# Patient Record
Sex: FEMALE | Race: BLACK OR AFRICAN AMERICAN | Employment: FULL TIME | URBAN - METROPOLITAN AREA
[De-identification: names, ages, dates, MRNs, and addresses within clinical notes are randomized per-mention and may not be internally consistent; named-entity substitution may affect disease eponyms.]

---

## 2019-10-02 ENCOUNTER — HOSPITAL ENCOUNTER (OUTPATIENT)
Dept: CT IMAGING | Age: 29
Discharge: HOME | End: 2019-10-04
Payer: COMMERCIAL

## 2019-10-02 DIAGNOSIS — R10.9 FLANK PAIN: ICD-10-CM

## 2019-10-02 PROCEDURE — 2580000003 HC RX 258: Performed by: RADIOLOGY

## 2019-10-02 PROCEDURE — 74178 CT ABD&PLV WO CNTR FLWD CNTR: CPT

## 2019-10-02 PROCEDURE — 6360000004 HC RX CONTRAST MEDICATION: Performed by: RADIOLOGY

## 2019-10-02 RX ORDER — SODIUM CHLORIDE 0.9 % (FLUSH) 0.9 %
10 SYRINGE (ML) INJECTION
Status: COMPLETED | OUTPATIENT
Start: 2019-10-02 | End: 2019-10-02

## 2019-10-02 RX ADMIN — Medication 10 ML: at 09:07

## 2019-10-02 RX ADMIN — IOPAMIDOL 110 ML: 755 INJECTION, SOLUTION INTRAVENOUS at 09:07

## 2019-10-02 RX ADMIN — IOHEXOL 50 ML: 240 INJECTION, SOLUTION INTRATHECAL; INTRAVASCULAR; INTRAVENOUS; ORAL at 09:07

## 2020-01-09 ENCOUNTER — HOSPITAL ENCOUNTER (EMERGENCY)
Age: 30
Discharge: HOME | End: 2020-01-09
Payer: COMMERCIAL

## 2020-01-09 VITALS
TEMPERATURE: 98.4 F | RESPIRATION RATE: 18 BRPM | HEART RATE: 88 BPM | SYSTOLIC BLOOD PRESSURE: 117 MMHG | DIASTOLIC BLOOD PRESSURE: 80 MMHG | OXYGEN SATURATION: 99 %

## 2020-01-09 PROCEDURE — 99282 EMERGENCY DEPT VISIT SF MDM: CPT

## 2020-01-09 RX ORDER — PREDNISONE 10 MG/1
TABLET ORAL
Qty: 20 TABLET | Refills: 0 | Status: SHIPPED | OUTPATIENT
Start: 2020-01-09 | End: 2020-01-19

## 2020-01-09 RX ORDER — NAPROXEN 500 MG/1
500 TABLET ORAL 2 TIMES DAILY PRN
Qty: 28 TABLET | Refills: 0 | Status: SHIPPED | OUTPATIENT
Start: 2020-01-09 | End: 2020-01-28

## 2020-01-09 RX ORDER — METHOCARBAMOL 500 MG/1
500 TABLET, FILM COATED ORAL 4 TIMES DAILY
Qty: 20 TABLET | Refills: 0 | Status: SHIPPED | OUTPATIENT
Start: 2020-01-09 | End: 2020-01-14

## 2020-01-09 ASSESSMENT — PAIN SCALES - GENERAL: PAINLEVEL_OUTOF10: 10

## 2020-01-09 ASSESSMENT — PAIN DESCRIPTION - PAIN TYPE: TYPE: CHRONIC PAIN

## 2020-01-09 NOTE — ED PROVIDER NOTES
DISPOSITION  Disposition: Discharge to home  Patient condition is good                 MIGUELINA Castro CNP  01/11/20 9346

## 2020-01-17 ENCOUNTER — APPOINTMENT (OUTPATIENT)
Dept: CT IMAGING | Age: 30
End: 2020-01-17
Payer: COMMERCIAL

## 2020-01-17 ENCOUNTER — HOSPITAL ENCOUNTER (EMERGENCY)
Age: 30
Discharge: HOME | End: 2020-01-17
Attending: EMERGENCY MEDICINE
Payer: COMMERCIAL

## 2020-01-17 VITALS
WEIGHT: 139 LBS | TEMPERATURE: 98 F | DIASTOLIC BLOOD PRESSURE: 80 MMHG | BODY MASS INDEX: 19.9 KG/M2 | HEIGHT: 70 IN | HEART RATE: 64 BPM | SYSTOLIC BLOOD PRESSURE: 120 MMHG | OXYGEN SATURATION: 100 % | RESPIRATION RATE: 18 BRPM

## 2020-01-17 LAB — HCG(URINE) PREGNANCY TEST: NEGATIVE

## 2020-01-17 PROCEDURE — 96374 THER/PROPH/DIAG INJ IV PUSH: CPT

## 2020-01-17 PROCEDURE — 81025 URINE PREGNANCY TEST: CPT

## 2020-01-17 PROCEDURE — 70450 CT HEAD/BRAIN W/O DYE: CPT

## 2020-01-17 PROCEDURE — 6360000002 HC RX W HCPCS: Performed by: EMERGENCY MEDICINE

## 2020-01-17 PROCEDURE — 99284 EMERGENCY DEPT VISIT MOD MDM: CPT

## 2020-01-17 RX ORDER — DIPHENHYDRAMINE HYDROCHLORIDE 50 MG/ML
25 INJECTION INTRAMUSCULAR; INTRAVENOUS ONCE
Status: COMPLETED | OUTPATIENT
Start: 2020-01-17 | End: 2020-01-17

## 2020-01-17 RX ORDER — KETOROLAC TROMETHAMINE 30 MG/ML
30 INJECTION, SOLUTION INTRAMUSCULAR; INTRAVENOUS EVERY 6 HOURS PRN
Status: DISCONTINUED | OUTPATIENT
Start: 2020-01-17 | End: 2020-01-17 | Stop reason: HOSPADM

## 2020-01-17 RX ORDER — DIPHENHYDRAMINE HYDROCHLORIDE 50 MG/ML
25 INJECTION INTRAMUSCULAR; INTRAVENOUS ONCE
Status: DISCONTINUED | OUTPATIENT
Start: 2020-01-17 | End: 2020-01-17 | Stop reason: HOSPADM

## 2020-01-17 RX ORDER — BUTALBITAL, ACETAMINOPHEN AND CAFFEINE 50; 325; 40 MG/1; MG/1; MG/1
1 TABLET ORAL EVERY 4 HOURS PRN
Qty: 20 TABLET | Refills: 0 | Status: SHIPPED | OUTPATIENT
Start: 2020-01-17 | End: 2020-01-24 | Stop reason: ALTCHOICE

## 2020-01-17 RX ADMIN — DIPHENHYDRAMINE HYDROCHLORIDE 25 MG: 50 INJECTION, SOLUTION INTRAMUSCULAR; INTRAVENOUS at 17:07

## 2020-01-17 RX ADMIN — KETOROLAC TROMETHAMINE 30 MG: 30 INJECTION, SOLUTION INTRAMUSCULAR; INTRAVENOUS at 17:07

## 2020-01-17 ASSESSMENT — PAIN SCALES - GENERAL
PAINLEVEL_OUTOF10: 10
PAINLEVEL_OUTOF10: 3
PAINLEVEL_OUTOF10: 10

## 2020-01-17 ASSESSMENT — PAIN DESCRIPTION - PAIN TYPE
TYPE: ACUTE PAIN
TYPE: ACUTE PAIN

## 2020-01-17 ASSESSMENT — PAIN DESCRIPTION - DESCRIPTORS: DESCRIPTORS: DISCOMFORT;THROBBING

## 2020-01-17 ASSESSMENT — PAIN DESCRIPTION - LOCATION
LOCATION: HEAD
LOCATION: HEAD;BACK;NECK

## 2020-01-17 NOTE — ED NOTES
Amariienmarybeth at bedside to take pt home     Amie Cardenas [facility]  01/17/20 3724

## 2020-01-17 NOTE — ED PROVIDER NOTES
HPI:  20,   Time: 4:15 PM         Marcie Gosselin is a 30 y.o. female presenting to the ED for a frontal headache, beginning 1 week ago. The complaint has been persistent, severe in severity, and worsened by changing position, light exertion. She also complains of neck pain for several months the patient has been taking Naprosyn and methocarbamol with no relief. There has been no fever chills and the patient denies any focal neurological deficits. . She states she has had a tubal ligation and takes birth control shots    ROS:   Pertinent positives and negatives are stated within HPI, all other systems reviewed and are negative.  --------------------------------------------- PAST HISTORY ---------------------------------------------  Past Medical History:  has a past medical history of GERD (gastroesophageal reflux disease), Mild or unspecified pre-eclampsia, antepartum, and UTI (urinary tract infection). Past Surgical History:  has a past surgical history that includes Induced ; Dental surgery; Colonoscopy; Dilation & curettage;  section; and Tubal ligation. Social History:  reports that she has quit smoking. Her smoking use included cigarettes. She smoked 0.25 packs per day. She has never used smokeless tobacco. She reports that she does not drink alcohol or use drugs. Family History: family history includes Diabetes in her maternal grandmother; Hypertension in her maternal grandmother. The patients home medications have been reviewed. Allergies: Patient has no known allergies.     -------------------------------------------------- RESULTS -------------------------------------------------  All laboratory and radiology results have been personally reviewed by myself   LABS:  Results for orders placed or performed during the hospital encounter of 20   Pregnancy, urine   Result Value Ref Range    HCG(Urine) Pregnancy Test NEGATIVE NEGATIVE       RADIOLOGY:  Interpreted by Radiologist.  CT Head WO Contrast   Final Result      No evidence of acute intracranial hemorrhage or edema. ------------------------- NURSING NOTES AND VITALS REVIEWED ---------------------------   The nursing notes within the ED encounter and vital signs as below have been reviewed. BP (!) 159/89   Pulse 64   Temp 98 °F (36.7 °C) (Oral)   Resp 16   Ht 5' 10\" (1.778 m)   Wt 139 lb (63 kg)   SpO2 99%   BMI 19.94 kg/m²   Oxygen Saturation Interpretation: Normal      ---------------------------------------------------PHYSICAL EXAM--------------------------------------      Constitutional/General: Alert and oriented x3, well appearing, non toxic in NAD  Head: NC/AT  Eyes: PERRL, EOMI  Mouth: Oral mucosa appears well-hydrated  Neck: Supple, full ROM, no meningeal signs  Pulmonary: Lungs clear to auscultation bilaterally, no wheezes, rales, or rhonchi. Not in respiratory distress  Cardiovascular:  Regular rate and rhythm, no murmurs, gallops, or rubs. 2+ distal pulses    Extremities: Moves all extremities x 4. Warm and well perfused  Skin: warm and dry without rash  Neurologic: Basic neurological examination shows no deficits grosslyName: Andrea Chinchilla  : 1990  MRN: [id number]    Date: 2020    Benefits of immediately proceeding with Radiology exam outweigh the risks and therefore the following is being waived:      [x] Pregnancy test    [] Protocol for Iodine allergy    [] MRI questionnaire    [] BUN/Creatinine        Beryl Wheeler MD      ,  Psych: Normal Affect      ------------------------------ ED COURSE/MEDICAL DECISION MAKING----------------------  Medications   ketorolac (TORADOL) injection 30 mg (30 mg Intravenous Given 20)   diphenhydrAMINE (BENADRYL) injection 25 mg (has no administration in time range)   diphenhydrAMINE (BENADRYL) injection 25 mg (25 mg Intravenous Given 20)         Medical Decision Making:      Time: 5:30p  Re-evaluation.

## 2020-01-17 NOTE — ED NOTES
C/O headache across forehead and pain in neck down entire spine. States has been going to physical therapy, has seen PCP and has been seen at [facility] main ED.   States really worried now since started having blurred vision     Sukumar Davey RN  01/17/20 122 Flor Clifton RN  01/17/20 4496

## 2020-01-17 NOTE — ED NOTES
Patient given discharge paperwork at this time. Patient also given scripts. Patient has no further questions at this time. Nurse provided education to patient. Patient is alert and oriented and VS are stable at this time.         Amie Cardenas RN  01/17/20 8931

## 2020-01-24 ENCOUNTER — HOSPITAL ENCOUNTER (EMERGENCY)
Age: 30
Discharge: HOME | End: 2020-01-24
Attending: EMERGENCY MEDICINE
Payer: COMMERCIAL

## 2020-01-24 VITALS
OXYGEN SATURATION: 98 % | WEIGHT: 137 LBS | TEMPERATURE: 98.3 F | DIASTOLIC BLOOD PRESSURE: 64 MMHG | RESPIRATION RATE: 16 BRPM | SYSTOLIC BLOOD PRESSURE: 112 MMHG | HEIGHT: 70 IN | BODY MASS INDEX: 19.61 KG/M2 | HEART RATE: 62 BPM

## 2020-01-24 LAB
INFLUENZA A BY PCR: NOT DETECTED
INFLUENZA B BY PCR: NOT DETECTED

## 2020-01-24 PROCEDURE — 99284 EMERGENCY DEPT VISIT MOD MDM: CPT

## 2020-01-24 PROCEDURE — 6360000002 HC RX W HCPCS: Performed by: EMERGENCY MEDICINE

## 2020-01-24 PROCEDURE — 87502 INFLUENZA DNA AMP PROBE: CPT

## 2020-01-24 PROCEDURE — 96375 TX/PRO/DX INJ NEW DRUG ADDON: CPT

## 2020-01-24 PROCEDURE — 96374 THER/PROPH/DIAG INJ IV PUSH: CPT

## 2020-01-24 PROCEDURE — 2580000003 HC RX 258: Performed by: EMERGENCY MEDICINE

## 2020-01-24 RX ORDER — METOCLOPRAMIDE HYDROCHLORIDE 5 MG/ML
10 INJECTION INTRAMUSCULAR; INTRAVENOUS ONCE
Status: COMPLETED | OUTPATIENT
Start: 2020-01-24 | End: 2020-01-24

## 2020-01-24 RX ORDER — LORAZEPAM 2 MG/ML
0.5 INJECTION INTRAMUSCULAR ONCE
Status: COMPLETED | OUTPATIENT
Start: 2020-01-24 | End: 2020-01-24

## 2020-01-24 RX ORDER — ONDANSETRON 4 MG/1
8 TABLET, ORALLY DISINTEGRATING ORAL EVERY 8 HOURS PRN
Qty: 12 TABLET | Refills: 0 | Status: SHIPPED | OUTPATIENT
Start: 2020-01-24

## 2020-01-24 RX ORDER — 0.9 % SODIUM CHLORIDE 0.9 %
1000 INTRAVENOUS SOLUTION INTRAVENOUS ONCE
Status: COMPLETED | OUTPATIENT
Start: 2020-01-24 | End: 2020-01-24

## 2020-01-24 RX ORDER — SUMATRIPTAN 50 MG/1
50 TABLET, FILM COATED ORAL
COMMUNITY

## 2020-01-24 RX ORDER — BUTALBITAL, ACETAMINOPHEN AND CAFFEINE 300; 40; 50 MG/1; MG/1; MG/1
1 CAPSULE ORAL EVERY 4 HOURS PRN
Qty: 30 CAPSULE | Refills: 0 | Status: SHIPPED | OUTPATIENT
Start: 2020-01-24 | End: 2020-01-31 | Stop reason: SDUPTHER

## 2020-01-24 RX ORDER — LORATADINE AND PSEUDOEPHEDRINE SULFATE 5; 120 MG/1; MG/1
1 TABLET, EXTENDED RELEASE ORAL 2 TIMES DAILY
Qty: 20 TABLET | Refills: 0 | Status: SHIPPED | OUTPATIENT
Start: 2020-01-24

## 2020-01-24 RX ORDER — DIPHENHYDRAMINE HYDROCHLORIDE 50 MG/ML
50 INJECTION INTRAMUSCULAR; INTRAVENOUS ONCE
Status: COMPLETED | OUTPATIENT
Start: 2020-01-24 | End: 2020-01-24

## 2020-01-24 RX ADMIN — METOCLOPRAMIDE 10 MG: 5 INJECTION, SOLUTION INTRAMUSCULAR; INTRAVENOUS at 21:20

## 2020-01-24 RX ADMIN — SODIUM CHLORIDE 1000 ML: 9 INJECTION, SOLUTION INTRAVENOUS at 21:19

## 2020-01-24 RX ADMIN — LORAZEPAM 0.5 MG: 2 INJECTION INTRAMUSCULAR; INTRAVENOUS at 21:20

## 2020-01-24 RX ADMIN — DIPHENHYDRAMINE HYDROCHLORIDE 50 MG: 50 INJECTION, SOLUTION INTRAMUSCULAR; INTRAVENOUS at 21:20

## 2020-01-24 ASSESSMENT — PAIN DESCRIPTION - DESCRIPTORS: DESCRIPTORS: THROBBING

## 2020-01-24 ASSESSMENT — PAIN DESCRIPTION - LOCATION: LOCATION: HEAD

## 2020-01-24 ASSESSMENT — PAIN SCALES - GENERAL: PAINLEVEL_OUTOF10: 10

## 2020-01-24 ASSESSMENT — PAIN DESCRIPTION - PROGRESSION: CLINICAL_PROGRESSION: GRADUALLY WORSENING

## 2020-01-24 ASSESSMENT — PAIN DESCRIPTION - PAIN TYPE: TYPE: ACUTE PAIN

## 2020-01-24 ASSESSMENT — PAIN DESCRIPTION - FREQUENCY: FREQUENCY: CONTINUOUS

## 2020-01-24 ASSESSMENT — PAIN DESCRIPTION - ORIENTATION: ORIENTATION: ANTERIOR;POSTERIOR

## 2020-01-24 ASSESSMENT — PAIN - FUNCTIONAL ASSESSMENT: PAIN_FUNCTIONAL_ASSESSMENT: PREVENTS OR INTERFERES SOME ACTIVE ACTIVITIES AND ADLS

## 2020-01-25 NOTE — ED PROVIDER NOTES
20)   LORazepam (ATIVAN) injection 0.5 mg (0.5 mg Intravenous Given 20)       ED COURSE:    Medical Decision Making:   Differential Diagnoses:  Common migraine headache, cluster headache, muscular tension headache, SAH, to name a few. Patient's headache is similar to previous headaches which she has had in the past.  This not felt to be the worst headache of her life. Patient was here previously and had a CT head study on 2020. No further studies are felt to be warranted at this time;  patient states her symptoms are markedly improved on repeat assessment prior to discharge. Counseling: The emergency provider has spoken with the patient and spouse/SO and discussed todays results, in addition to providing specific details for the plan of care and counseling regarding the diagnosis and prognosis. Questions are answered at this time and they are agreeable with the plan. Controlled Substance Monitoring:  Acute and Chronic Pain Monitoring:   No flowsheet data found. Radiology Procedure Waiver   Name: Girma Martinez  : 1990  MRN: [id number]    Date:  20    Time: 8:38 PM    Benefits of immediately proceeding with Radiology exam(s) without pre-testing outweigh the risks or are not indicated as specified below and therefore the following is/are being waived:    [x] Pregnancy test   [] Patients LMP on-time and regular. [x] Patient had Tubal Ligation or has other Contraception Device. [] Patient  is Menopausal or Premenarcheal.    [] Patient had Full or Partial Hysterectomy. [] Protocol for Iodine allergy    [] MRI Questionnaire     [] BUN/Creatinine   [] Patient age w/no hx of renal dysfunction. [] Patient on Dialysis. [] Recent Normal Labs. Electronically signed by Fran Estes MD on 20 at 8:38 PM            --------------------------------- IMPRESSION AND DISPOSITION ---------------------------------    IMPRESSION  1.  Migraine without aura and with

## 2020-01-28 ENCOUNTER — TELEPHONE (OUTPATIENT)
Dept: PHYSICAL MEDICINE AND REHAB | Age: 30
End: 2020-01-28

## 2020-01-28 ENCOUNTER — OFFICE VISIT (OUTPATIENT)
Dept: PHYSICAL MEDICINE AND REHAB | Age: 30
End: 2020-01-28
Payer: COMMERCIAL

## 2020-01-28 VITALS
WEIGHT: 137 LBS | HEART RATE: 64 BPM | HEIGHT: 70 IN | DIASTOLIC BLOOD PRESSURE: 66 MMHG | SYSTOLIC BLOOD PRESSURE: 118 MMHG | BODY MASS INDEX: 19.61 KG/M2

## 2020-01-28 PROCEDURE — G8427 DOCREV CUR MEDS BY ELIG CLIN: HCPCS | Performed by: PHYSICAL MEDICINE & REHABILITATION

## 2020-01-28 PROCEDURE — 99204 OFFICE O/P NEW MOD 45 MIN: CPT | Performed by: PHYSICAL MEDICINE & REHABILITATION

## 2020-01-28 PROCEDURE — 1036F TOBACCO NON-USER: CPT | Performed by: PHYSICAL MEDICINE & REHABILITATION

## 2020-01-28 PROCEDURE — G8484 FLU IMMUNIZE NO ADMIN: HCPCS | Performed by: PHYSICAL MEDICINE & REHABILITATION

## 2020-01-28 PROCEDURE — G8420 CALC BMI NORM PARAMETERS: HCPCS | Performed by: PHYSICAL MEDICINE & REHABILITATION

## 2020-01-28 RX ORDER — TIZANIDINE 2 MG/1
2 TABLET ORAL EVERY 8 HOURS PRN
Qty: 90 TABLET | Refills: 1 | Status: SHIPPED | OUTPATIENT
Start: 2020-01-28

## 2020-01-28 NOTE — PROGRESS NOTES
Chiquita Modi, [id number] [facility] Physical Medicine and Rehabilitation  [address] [facility] [facility] [place]  Phone: [phone]  Fax: [phone]    PCP: Arian Palacios DO  Date of visit: 1/28/20    Chief Complaint   Patient presents with   Otilia Trinh Patient   Hari Whitfield       Dear Dr. Gorge Bell,     Thank you for referring your patient to be seen. As you know,  Ashley Noland is a 30 y.o. female with past medical history as below who presents with neck and back pain for 4 month(s). There was a gradual onset of pain after no known injury. Now, the pain is constant and occurs daily. The pain is rated Pain Score:  10 - Worst pain ever, is described as throbbing, sharp and is located in the left low back with radiation \"up spine\". The symptoms have been worse since onset. The pain is better with nothing. The pain is worse with everything. There is no associated numbness/tingling. There is no weakness. There is no bowel/bladder changes. The prior workup has included: none     The prior treatment has included:  PT: Lucian with temporary relief    Chiropractic: none    Modalities: none    OTC Tylenol: none    NSAIDS: naprosyn, ibuprofen with no relief   Opioids: none    Membrane stabilizers: none    Muscle relaxers: robaxin with no relief   Previous injections: none .    Previous surgery at this site: none    No Known Allergies    Current Outpatient Medications   Medication Sig Dispense Refill    diclofenac (VOLTAREN) 50 MG EC tablet Take 1 tablet by mouth 2 times daily as needed for Pain 60 tablet 2    tiZANidine (ZANAFLEX) 2 MG tablet Take 1 tablet by mouth every 8 hours as needed (pain) 90 tablet 1    Tens Unit MISC by Does not apply route 4-lead 1 each 0    SUMAtriptan (IMITREX) 50 MG tablet Take 50 mg by mouth once as needed for Migraine      butalbital-APAP-caffeine (FIORICET) -40 MG CAPS per capsule Take 1 capsule by mouth every 4 hours as needed for Headaches 30 capsule 0    ondansetron (ZOFRAN ODT) 4 MG disintegrating tablet Take 2 tablets by mouth every 8 hours as needed for Nausea or Vomiting 12 tablet 0    CLARITIN-D 12 HOUR 5-120 MG per extended release tablet Take 1 tablet by mouth 2 times daily For congestion relief as needed. 20 tablet 0     No current facility-administered medications for this visit. Past Medical History:   Diagnosis Date    GERD (gastroesophageal reflux disease)     Mild or unspecified pre-eclampsia, antepartum 2014    UTI (urinary tract infection)        Past Surgical History:   Procedure Laterality Date     SECTION      COLONOSCOPY      DENTAL SURGERY      DILATION AND CURETTAGE      INDUCED       TUBAL LIGATION         Family History   Problem Relation Age of Onset    Diabetes Maternal Grandmother     Hypertension Maternal Grandmother        Social History     Tobacco Use    Smoking status: Former Smoker     Packs/day: 0.25     Types: Cigarettes    Smokeless tobacco: Never Used   Substance Use Topics    Alcohol use: No    Drug use: No          Functional Status: The patient is able to ambulate and perform activities of daily living without the use of an assistive device. 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 ROS: For more complete ROS answered by the patient, please see . Constitutional: + fevers, chills, denies night sweats, unintentional weight loss     Skin: Denies rash or skin changes     Eyes: Denies vision changes    Ears/Nose/Throat: Denies nasal congestion or sore throat     Respiratory: + SOB or cough     Cardiovascular: + CP, palpitations, edema      Gastrointestinal: +abdominal pain,  N/V, constipation, or diarrhea    Genitourinary: Denies urinary symptoms    Neurologic: See HPI.     MSK: See HPI.      Psychiatric: Denies sleep disturbance, anxiety, depression Reflexes:   R  L  Biceps   (2+) (2+)  Triceps  (2+) (2+)  BR   (2+) (2+)  Patellar  (2+) (2+)  Ankle Jerk  (2+) (2+)    Sommer is negative bilaterally. Gait is Normal.     Imaging:     Impression:   Yaritza Chacon is a 30 y.o. female     1. Neck pain    2. Chronic bilateral thoracic back pain    3. Chronic left-sided low back pain without sciatica        Plan:   · Obtain cervical/thoracic/lumbar x-ray   · TENS trial   · Start voltaren 50mg BID PRN   · Start zanaflex 2mg TID PRN      The patient was educated about the diagnosis, prognosis, indications, risks and benefits of treatment. An opportunity to ask questions was given to the patient and questions were answered. The patient agreed to proceed with the recommended treatment as described above.  Follow up in 6 weeks. Thank you for the consultation and for allowing me to participate in the care of this patient.         Sincerely,     Amanda Kothari DO, [facility]   Board Certified Physical Medicine and Rehabilitation

## 2020-01-28 NOTE — TELEPHONE ENCOUNTER
----- Message from Regan Neal DO sent at 1/28/2020 12:10 PM EST -----  Please call patient with x-results--- all normal.

## 2020-01-28 NOTE — TELEPHONE ENCOUNTER
Called and spoke with the patient to inform her that her xray results were normal.  Patient is aware and voiced understanding.

## 2020-01-31 ENCOUNTER — HOSPITAL ENCOUNTER (EMERGENCY)
Age: 30
Discharge: HOME | End: 2020-01-31
Payer: COMMERCIAL

## 2020-01-31 VITALS
TEMPERATURE: 98.1 F | BODY MASS INDEX: 19.47 KG/M2 | DIASTOLIC BLOOD PRESSURE: 78 MMHG | RESPIRATION RATE: 14 BRPM | WEIGHT: 136 LBS | HEIGHT: 70 IN | SYSTOLIC BLOOD PRESSURE: 118 MMHG | OXYGEN SATURATION: 99 % | HEART RATE: 84 BPM

## 2020-01-31 LAB — HCG(URINE) PREGNANCY TEST: NEGATIVE

## 2020-01-31 PROCEDURE — 99284 EMERGENCY DEPT VISIT MOD MDM: CPT

## 2020-01-31 PROCEDURE — 2580000003 HC RX 258: Performed by: EMERGENCY MEDICINE

## 2020-01-31 PROCEDURE — 96361 HYDRATE IV INFUSION ADD-ON: CPT

## 2020-01-31 PROCEDURE — 81025 URINE PREGNANCY TEST: CPT

## 2020-01-31 PROCEDURE — 96374 THER/PROPH/DIAG INJ IV PUSH: CPT

## 2020-01-31 PROCEDURE — 96375 TX/PRO/DX INJ NEW DRUG ADDON: CPT

## 2020-01-31 PROCEDURE — 6360000002 HC RX W HCPCS: Performed by: EMERGENCY MEDICINE

## 2020-01-31 RX ORDER — DIPHENHYDRAMINE HYDROCHLORIDE 50 MG/ML
25 INJECTION INTRAMUSCULAR; INTRAVENOUS ONCE
Status: COMPLETED | OUTPATIENT
Start: 2020-01-31 | End: 2020-01-31

## 2020-01-31 RX ORDER — SODIUM CHLORIDE, SODIUM LACTATE, POTASSIUM CHLORIDE, CALCIUM CHLORIDE 600; 310; 30; 20 MG/100ML; MG/100ML; MG/100ML; MG/100ML
1000 INJECTION, SOLUTION INTRAVENOUS ONCE
Status: COMPLETED | OUTPATIENT
Start: 2020-01-31 | End: 2020-01-31

## 2020-01-31 RX ORDER — METOCLOPRAMIDE HYDROCHLORIDE 5 MG/ML
10 INJECTION INTRAMUSCULAR; INTRAVENOUS ONCE
Status: COMPLETED | OUTPATIENT
Start: 2020-01-31 | End: 2020-01-31

## 2020-01-31 RX ORDER — KETOROLAC TROMETHAMINE 30 MG/ML
30 INJECTION, SOLUTION INTRAMUSCULAR; INTRAVENOUS ONCE
Status: COMPLETED | OUTPATIENT
Start: 2020-01-31 | End: 2020-01-31

## 2020-01-31 RX ORDER — BUTALBITAL, ACETAMINOPHEN AND CAFFEINE 300; 40; 50 MG/1; MG/1; MG/1
1 CAPSULE ORAL EVERY 6 HOURS PRN
Qty: 10 CAPSULE | Refills: 0 | Status: SHIPPED | OUTPATIENT
Start: 2020-01-31

## 2020-01-31 RX ADMIN — KETOROLAC TROMETHAMINE 30 MG: 30 INJECTION, SOLUTION INTRAMUSCULAR at 18:58

## 2020-01-31 RX ADMIN — SODIUM CHLORIDE, POTASSIUM CHLORIDE, SODIUM LACTATE AND CALCIUM CHLORIDE 1000 ML: 600; 310; 30; 20 INJECTION, SOLUTION INTRAVENOUS at 19:01

## 2020-01-31 RX ADMIN — METOCLOPRAMIDE 10 MG: 5 INJECTION, SOLUTION INTRAMUSCULAR; INTRAVENOUS at 18:57

## 2020-01-31 RX ADMIN — DIPHENHYDRAMINE HYDROCHLORIDE 25 MG: 50 INJECTION, SOLUTION INTRAMUSCULAR; INTRAVENOUS at 18:57

## 2020-01-31 ASSESSMENT — PAIN DESCRIPTION - LOCATION: LOCATION: HEAD

## 2020-01-31 ASSESSMENT — PAIN SCALES - GENERAL
PAINLEVEL_OUTOF10: 3
PAINLEVEL_OUTOF10: 10

## 2020-01-31 ASSESSMENT — PAIN DESCRIPTION - PAIN TYPE: TYPE: ACUTE PAIN

## 2020-01-31 ASSESSMENT — PAIN DESCRIPTION - DESCRIPTORS: DESCRIPTORS: THROBBING

## 2020-01-31 NOTE — ED NOTES
Asked patient for urine sample, patient refused, said Ahsley Katz has already given us one this week and her tubes are tied. \"     Suzanne Rowland  01/31/20 0760

## 2020-01-31 NOTE — ED NOTES
Pt at restroom to collect a urine sample for pregnancy test.      Shannon Garcia, HAYLEY  01/31/20 2396

## 2020-01-31 NOTE — ED NOTES
Pt reports light sensitivity. Lights were dimmed in the room. Call light is within reach.      Two [facility]  01/31/20 182

## 2020-02-01 NOTE — ED NOTES
IV removed. Dressing applied. Discharge instructions and prescription given. Patient states verbal understanding. Ambulatory to exit.        Dorina Smith RN  01/31/20 2055

## 2020-02-01 NOTE — ED PROVIDER NOTES
deformities. Back: No midline or paraspinal tenderness  Skin:  Warm and dry. Neurologic: CNs grossly intact. Normal motor function, normal sensory function, no other focal deficits noted. Psychiatric:  Appropriate mood and behavior        NURSING NOTES AND VITALS REVIEWED  The nursing notes within the ED encounter and vital signs as below have been reviewed by myself. Patient Vitals for the past 24 hrs:   BP Temp Temp src Pulse Resp SpO2 Height Weight   01/31/20 1804 -- -- -- -- -- -- 5' 10\" (1.778 m) 136 lb (61.7 kg)   01/31/20 1756 118/78 98.1 °F (36.7 °C) Oral 84 14 99 % -- --       The patients available past medical records and past encounters were reviewed. Medical decision making: Patient is a 20-year-old female presenting for headache that has been ongoing for 3-1/2 weeks. Patient's vital signs were stable. Her physical exam was unremarkable. She had a normal neurological exam.  Patient was given IV fluids, Reglan, Benadryl and Toradol. Her prior CT head showed no acute process a lower concern for any tumors or intracranial process. Her symptoms have been ongoing for weeks and is not acute and is severe on onset low concern for subarachnoid hemorrhage. Likely migraine headache. Will control her pain at this time. Patient felt much better after medications. At this time likely her acute on chronic headache. Patient was given Fioricet prescription refill. Was given follow-up with neurology on-call advised to call Monday for soonest available appointment. Advised to follow-up with her PCP in 3 to 5 days. Was also advised to drink plenty of fluids and to avoid any triggers for her headache. Return precautions discussed. Will be discharged home in stable condition. .    ED Course as of Jan 31 2050 Fri Jan 31, 2020 1914 HCG(Urine) Pregnancy Test: NEGATIVE [AL]   2048 She states she feels much better.     [AL]      ED Course User Index  [AL] Anna Jovel DO       ED MEDICATIONS  Medications   diphenhydrAMINE (BENADRYL) injection 25 mg (25 mg Intravenous Given 1/31/20 1857)   ketorolac (TORADOL) injection 30 mg (30 mg Intravenous Given 1/31/20 1858)   metoclopramide (REGLAN) injection 10 mg (10 mg Intravenous Given 1/31/20 1857)   lactated ringers infusion 1,000 mL (0 mLs Intravenous Stopped 1/31/20 2055)       Discharge Medication List as of 1/31/2020  8:50 PM          IMPRESSION  1. Acute nonintractable headache, unspecified headache type    2.  Migraine without aura and with status migrainosus, not intractable           [address] [facility]  02/01/20 2076

## 2022-06-13 LAB
MEASLES IMMUNE (IGG): NORMAL
MUMPS AB IGG: NORMAL
RUBELLA ANTIBODY IGG: NORMAL

## 2022-06-14 LAB — VARICELLA-ZOSTER VIRUS AB, IGG: NORMAL

## 2022-11-04 ENCOUNTER — HOSPITAL ENCOUNTER (OUTPATIENT)
Dept: CT IMAGING | Age: 32
Discharge: HOME | End: 2022-11-06
Payer: COMMERCIAL

## 2022-11-04 DIAGNOSIS — R51.9 CHRONIC INTRACTABLE HEADACHE, UNSPECIFIED HEADACHE TYPE: ICD-10-CM

## 2022-11-04 DIAGNOSIS — G89.29 CHRONIC INTRACTABLE HEADACHE, UNSPECIFIED HEADACHE TYPE: ICD-10-CM

## 2022-11-04 PROCEDURE — 6360000004 HC RX CONTRAST MEDICATION: Performed by: RADIOLOGY

## 2022-11-04 PROCEDURE — 70460 CT HEAD/BRAIN W/DYE: CPT

## 2022-11-04 RX ADMIN — IOPAMIDOL 75 ML: 755 INJECTION, SOLUTION INTRAVENOUS at 14:34

## 2023-03-28 ENCOUNTER — OFFICE VISIT (OUTPATIENT)
Dept: NEUROLOGY | Age: 33
End: 2023-03-28
Payer: COMMERCIAL

## 2023-03-28 VITALS
RESPIRATION RATE: 18 BRPM | DIASTOLIC BLOOD PRESSURE: 70 MMHG | BODY MASS INDEX: 21.47 KG/M2 | HEART RATE: 66 BPM | SYSTOLIC BLOOD PRESSURE: 107 MMHG | WEIGHT: 150 LBS | TEMPERATURE: 98.4 F | HEIGHT: 70 IN | OXYGEN SATURATION: 100 %

## 2023-03-28 DIAGNOSIS — G43.009 MIGRAINE WITHOUT AURA AND WITHOUT STATUS MIGRAINOSUS, NOT INTRACTABLE: ICD-10-CM

## 2023-03-28 DIAGNOSIS — M54.81 OCCIPITAL NEURALGIA OF RIGHT SIDE: Primary | ICD-10-CM

## 2023-03-28 PROCEDURE — 1036F TOBACCO NON-USER: CPT | Performed by: PHYSICIAN ASSISTANT

## 2023-03-28 PROCEDURE — G8427 DOCREV CUR MEDS BY ELIG CLIN: HCPCS | Performed by: PHYSICIAN ASSISTANT

## 2023-03-28 PROCEDURE — G8484 FLU IMMUNIZE NO ADMIN: HCPCS | Performed by: PHYSICIAN ASSISTANT

## 2023-03-28 PROCEDURE — G8420 CALC BMI NORM PARAMETERS: HCPCS | Performed by: PHYSICIAN ASSISTANT

## 2023-03-28 PROCEDURE — 99204 OFFICE O/P NEW MOD 45 MIN: CPT | Performed by: PHYSICIAN ASSISTANT

## 2023-03-28 RX ORDER — RIZATRIPTAN BENZOATE 10 MG/1
10 TABLET ORAL
Qty: 9 TABLET | Refills: 0 | Status: SHIPPED | OUTPATIENT
Start: 2023-03-28 | End: 2023-03-28

## 2023-03-28 RX ORDER — TOPIRAMATE 25 MG/1
25 TABLET ORAL DAILY
Qty: 30 TABLET | Refills: 0 | Status: SHIPPED | OUTPATIENT
Start: 2023-03-28

## 2023-03-28 RX ORDER — NAPROXEN 500 MG/1
1 TABLET ORAL 2 TIMES DAILY
COMMUNITY
Start: 2020-01-30

## 2023-03-28 NOTE — PROGRESS NOTES
[facility])   Office visit- new patient     Date:  3/28/2023  Patient Name:  Odilia Owens  YOB: 1990  MRN: [id number]     PCP:  Calvin Saldana DO   Referring:  Chuckie Bolanos DO      Chief Complaint: headaches     History obtained from: patient     Assessment    Migraine without aura with > 15 migraine days per month, exacerbated by R occipital neuralgia at this time. Has never been on preventative therapy. Will start Topamax 25 mg daily. Abortive medications tried and failed: Fioricet, Imitrex (works moderately, but makes her too tired). Will try Maxalt 10 mg prn for abortive therapy at this time. Samples of nurtec and Ubrelvy provided to try as well. Will refer for occipital nerve blocks       Plan  Topamax 25 mg daily  Maxalt 10 mg as needed abortive therapy  Discontinue sumatriptan  Referral for occipital nerve blocks  Lifestyle modifications  Headache diary  Return to office in 2 to 3 months or sooner as needed  Call questions or concerns      History of Present Illness:  Odilia Owens is a 33 y.o. right handed female presenting for evaluation of headaches. Headaches began in 2020. They are described as starting in the right occipital region and radiating over the top of the head and across the forehead. They are throbbing and pounding in nature with associated light and sound sensitivity and occasional nausea. She denied any vomiting. Initially they were occurring 2 times per week, but now are occurring almost daily. They last 5 to 6 hours if left untreated. She has tried over-the-counter analgesics such as Excedrin which only mildly helps. She was given Fioricet, however stated she did not want to take this due to its addictive properties. She was given sumatriptan 50 mg as needed. This helps, however reports that it makes her too tired. She does work 3 jobs and also has children.   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

## 2023-04-17 ENCOUNTER — OFFICE VISIT (OUTPATIENT)
Dept: PHYSICAL MEDICINE AND REHAB | Age: 33
End: 2023-04-17
Payer: COMMERCIAL

## 2023-04-17 VITALS
HEART RATE: 92 BPM | TEMPERATURE: 98.1 F | WEIGHT: 151 LBS | SYSTOLIC BLOOD PRESSURE: 107 MMHG | BODY MASS INDEX: 21.62 KG/M2 | DIASTOLIC BLOOD PRESSURE: 64 MMHG | HEIGHT: 70 IN

## 2023-04-17 DIAGNOSIS — M54.81 OCCIPITAL NEURALGIA OF RIGHT SIDE: Primary | ICD-10-CM

## 2023-04-17 PROCEDURE — 64405 NJX AA&/STRD GR OCPL NRV: CPT | Performed by: PHYSICAL MEDICINE & REHABILITATION

## 2023-04-17 RX ORDER — BUPIVACAINE HYDROCHLORIDE 2.5 MG/ML
2 INJECTION, SOLUTION INFILTRATION; PERINEURAL ONCE
Status: COMPLETED | OUTPATIENT
Start: 2023-04-17 | End: 2023-04-17

## 2023-04-17 RX ADMIN — BUPIVACAINE HYDROCHLORIDE 5 MG: 2.5 INJECTION, SOLUTION INFILTRATION; PERINEURAL at 10:18

## 2023-08-10 RX ORDER — TOPIRAMATE 25 MG/1
TABLET ORAL
Qty: 30 TABLET | Refills: 0 | OUTPATIENT
Start: 2023-08-10